# Patient Record
Sex: FEMALE | Race: WHITE
[De-identification: names, ages, dates, MRNs, and addresses within clinical notes are randomized per-mention and may not be internally consistent; named-entity substitution may affect disease eponyms.]

---

## 2019-09-15 ENCOUNTER — HOSPITAL ENCOUNTER (EMERGENCY)
Dept: HOSPITAL 25 - UCEAST | Age: 79
Discharge: HOME | End: 2019-09-15
Payer: MEDICARE

## 2019-09-15 VITALS — DIASTOLIC BLOOD PRESSURE: 52 MMHG | SYSTOLIC BLOOD PRESSURE: 116 MMHG

## 2019-09-15 DIAGNOSIS — Z85.3: ICD-10-CM

## 2019-09-15 DIAGNOSIS — Z79.01: ICD-10-CM

## 2019-09-15 DIAGNOSIS — I48.91: ICD-10-CM

## 2019-09-15 DIAGNOSIS — Z87.891: ICD-10-CM

## 2019-09-15 DIAGNOSIS — L03.115: Primary | ICD-10-CM

## 2019-09-15 PROCEDURE — G0463 HOSPITAL OUTPT CLINIC VISIT: HCPCS

## 2019-09-15 PROCEDURE — 99212 OFFICE O/P EST SF 10 MIN: CPT

## 2019-09-15 NOTE — UC
Skin Complaint HPI





- HPI Summary


HPI Summary: 





started to feel "achey" last pm. did not take temp but felt feverish. patient 

awoke today with red rash R upper arm and across R chest. patient concerned 

about cellulitis since she has bilateral mastectomies and lymphedema R arm. 





- History of Current Complaint


Chief Complaint: UCSkin


Time Seen by Provider: 09/15/19 09:25


Stated Complaint: RASH


Hx Obtained From: Patient


Hx Last Menstrual Period: menopausal


Onset/Duration: Gradual Onset


Current Severity: Mild


Pain Intensity: 2


Location: Other - R upper arm


Aggravating Factor(s): Nothing


Alleviating Factor(s): Nothing


Associated Signs & Symptoms: Negative: Nausea, Shivering, Chills


Similar Episode/Dx as: cellulitis





- Allergy/Home Medications


Allergies/Adverse Reactions: 


 Allergies











Allergy/AdvReac Type Severity Reaction Status Date / Time


 


acetaminophen [From Vicodin] Allergy Intermediate Itching Verified 09/15/19 09:

32


 


Adhesive Tape Allergy Intermediate RED BUMPY Verified 09/15/19 09:32





   ITCHY RASH  


 


gabapentin Allergy Intermediate Itching Verified 09/15/19 09:32


 


hydrocodone AdvReac Severe Itching Verified 09/15/19 09:32


 


oxycodone [From Percocet] AdvReac  Flushing Verified 09/15/19 09:32














PMH/Surg Hx/FS Hx/Imm Hx


Previously Healthy: Yes


Cardiovascular History: Atrial Fibrillation


Cancer History: Breast Cancer


Other History Of: Anticoagulant Therapy - COUMADIN FOR AFIB





- Surgical History


Surgical History: Yes


Surgery Procedure, Year, and Place: RIGHT PATELLA; BILAT MASTECTOMY, BLAS(BONE 

OUTGROWTH-BENIGN) RIGHT JAW, LYMPHNODES REMOVED; BILAT KNEE REPLACEMENTS AT 

[facility]; POWER PORT PLACEMENT AND REMOVED; INGUINAL HERNIA; EAR(CANCER 

REMOVED FROM OUTSIDE)-2014; CARDIAC ABLATION 2011 (FOR AFIB); BILAT CATARACTS-

[facility]; PARTIAL THYROIDECTOMY- REMOVAL OF TUMOR-01/2013; T&A





- Family History


Known Family History: Positive: Cardiac Disease, Hypertension, Diabetes





- Social History


Occupation: Retired


Lives: With Family


Alcohol Use: Occasionally


Alcohol Amount: 1 GLASS OF WINE ON SPECIAL OCCASIONS/HOLIDAYS


Substance Use Type: None


Smoking Status (MU): Former Smoker


Type: Cigarettes


Amount Used/How Often: UP TO 1 1/2 PPD X 8-10 YEARS


Length of Time of Smoking/Using Tobacco: 10 YRS


Have You Smoked in the Last Year: No


When Did the Patient Quit Smoking/Using Tobacco: 1965





- Immunization History


Most Recent Tetanus Shot: 2005





Review of Systems


All Other Systems Reviewed And Are Negative: Yes


Constitutional: Positive: Other - achey


Skin: Positive: Other - redness upper R arm


Respiratory: Positive: Negative


Cardiovascular: Positive: Negative


Neurological: Positive: Negative


Psychological: Positive: Negative





Physical Exam


Triage Information Reviewed: Yes


Appearance: Well-Appearing, No Pain Distress, Well-Nourished


Vital Signs: 


 Initial Vital Signs











Temp  97.5 F   09/15/19 09:26


 


Pulse  60   09/15/19 09:26


 


Resp  18   09/15/19 09:26


 


BP  116/52   09/15/19 09:26


 


Pulse Ox  98   09/15/19 09:26











Vital Signs Reviewed: Yes


Respiratory Exam: Normal


Respiratory: Positive: Lungs clear


Cardiovascular Exam: Other - irregular rate


Musculoskeletal Exam: Normal


Psychological Exam: Normal


Skin: Positive: Other - erythema and warmth upper R arm that extends to R chest 

wall





Course/Dx





- Differential Diagnoses - Skin Complaint


Differential Diagnoses: Cellulitis, Contact Dermatitis, Varicella Zoster





- Diagnoses


Provider Diagnosis: 


 Cellulitis








Discharge ED





- Sign-Out/Discharge


Documenting (check all that apply): Patient Departure


All imaging exams completed and their final reports reviewed: No Studies





- Discharge Plan


Condition: Stable


Disposition: HOME


Prescriptions: 


Cephalexin CAP* [Keflex CAP*] 500 mg PO QID #40 cap


Patient Education Materials:  Cellulitis (ED)


Referrals: 


Kennedy Finn MD [Primary Care Provider] - 2 Days (for recheck)


Additional Instructions: 


apply warm packs to arm


start antibiotic today and take as directed


report to ER if you experience fever or worsening symptoms at anytime





- Billing Disposition and Condition


Condition: STABLE


Disposition: Home

## 2019-09-15 NOTE — XMS REPORT
Continuity of Care Document (CCD)

 Created on:2019



Patient:Yeimy Stoner

Sex:Female

:1940

External Reference #:[id number]





Demographics







 [place] [address]

 

 Home Phone  [phone]

 

 Mobile Phone  [phone]

 

 Email Address  [email or web address]

 

 Preferred Language  en

 

 Marital Status  Not  or 

 

 Sabianist Affiliation  Unknown

 

 Race  White

 

 Ethnic Group  Not  or 









Author







 Name  TRIXIE Humphrey (transmitted by agent of provider Bhavya Murphy)

 

 Address  [address] [place] [id number]









Care Team Providers







 Name  Role  Phone

 

 Isael Rose MD - Family Medicine  Care Team Information   [phone]
[phone]

 

 Kennedy Finn MD - Family Medicine  Care Team Information   [phone]
[phone]









Problems







 Active Problems  Provider  Date

 

 Obstructive sleep apnea of adult  Glendy King DNP, RN,  Onset: 2014



   [facility]  

 

 Atrial fibrillation  Leigh Killian M.D.  Onset: 2014

 

 Obstructive sleep apnea syndrome  Leigh Killian M.D.  Onset: 2014

 

 Dyspnea  Leigh Killian M.D.  Onset: 2014

 

 Essential hypertension  Leigh Killian M.D.  Onset: 2014

 

 Localized, primary osteoarthritis of  Tiki Wei M.D.  Onset: 2016



 the pelvic region and thigh    

 

 Paroxysmal atrial fibrillation  Leigh Killian M.D.  Onset: 2016

 

 Pure hypercholesterolemia  Leigh Killian M.D.  Onset: 2016

 

 Congenital heart disease  Leigh Killian M.D.  Onset: 2016

 

 Gluteal tendinitis  Tiki Wei M.D.  Onset: 2016

 

 Arthralgia of the pelvic region and  Tiki Wei M.D.  Onset: 2016



 thigh    

 

 Congenital abnormality of vein  Leigh Killian M.D.  Onset: 2016

 

 Hypersomnia  Glendy King DNP, RN,  Onset: 2016



   FNP-BC  







Social History







 Type  Date  Description  Comments

 

 Birth Sex    Unknown  

 

 Tobacco Use  Start: Unknown End:  Former Cigarette Smoker  



   Unknown    

 

 Smoking Status  Reviewed: 19  Former Cigarette Smoker  

 

 ETOH Use    Occasionally consumes  wine



     alcohol  

 

 Tobacco Use  Start: Unknown End:  Patient is a former  hx 1-2  per



   Unknown  smoker  day cigarettes,



       quit smoking 1964

 

 Recreational Drug Use    Denies Drug Use  

 

 Exercise Type/Frequency    Exercises sporadically  







Allergies, Adverse Reactions, Alerts







 Active Allergies  Reaction  Severity  Comments  Date

 

 Tape  ITCH, RED BLISTERS    Adhesive Tape  2013

 

 Lisinopril  cough      2013

 

 Neurontin  Contact dermatitis      2013

 

 Percocet  Flushed      12/10/2013

 

 Hydrocodone  itch      12/10/2013







Medications







 Active Medications  SIG  Qnty  Indications  Ordering Provider  Date

 

 Vortex Valved  use as instructed  1units    Linda Mascorro,  2018



 Holding Chamber        MD  



           



 Device          



           

 

 Dofetilide  1 tab by mouth  30caps    Bri Valerio,  2016



           500mcg  twice a day      N.P.  



 Capsules          



           

 

 Ranitidine HCL  1 by mouth twice a      Unknown  2016



               150mg  day        



 Capsules          



           

 

 Potassium Citrate ER  sun,mon,wed,fri      Unknown  



           



 10Meq (1080 mg)          



 Tablets ER          



           

 

 Vitamin D3 Super        Unknown  



 Strength          

 

 Vitamin B-12        Unknown  



     Natural          

 

 Ventolin HFA  1-2 puffs 4 times  18gm    Glendy King,  



   per day as needed      DNP, RN, FNP-BC  



 108(90Base) mcg/Act          



 Aerosol          



           

 

 Cpap        Unknown  



      Device          



           

 

 Vitamin C  1 cap po bid      Unknown  



          500mg          



 Capsules          



           

 

 Tylenol PM  1 tablets at      Unknown  



            Tablets  bedtime        



           

 

 Anastrozole  1 po qd morning      Unknown  



            1mg          



 Tablets          



           

 

 Klor-Con 10  1 po qd evening  30tabs    Unknown  



            10Meq          



 Tablets ER          



           

 

 Calcium + D  1 tab bid      Unknown  



            600mg          



           

 

 Magnesium  1 po qd evening  30tabs    Unknown  



          250mg          



 Tablets          



           

 

 Warfarin Sodium  take as directed  60tabs    Cheo Ramirez,  



                5mg  Lexie Brown tue, MD  



 Tablets  thur , as directed        



           

 

 Pravastatin Sodium  1 tablet by mouth  30tabs    Unknown  



   once daily at        



 20mg Tablets  bedtime        



           

 

 Furosemide  1 by mouth  90tabs    Tenzin Martin,  



           20mg  ,      DO FACC  



 Tablets  ,        



   , Sundays        







Medications Administered in Office







 Medication  SIG  Qnty  Indications  Ordering Provider  Date

 

 Depomedrol 40MG        Jody Campos M.D.  2019



         Injection          



           

 

 Inj, Regadenoson, 0.1 MG        Tenzin Martin, DO [facility]  06/15/2017



                  Injection          



           

 

 Technetium TC 99M        Tenzin Martin, DO [facility]  06/15/2017



 Tetrofosmin, Per Unit Dose          



 Up To 40 Millicuries          



              Injection          



           

 

 Depomedrol 40MG        Tiki Wei M.D.  2016



         Injection          



           







Immunizations







 CPT Code  Status  Date  Vaccine  Lot #

 

 [id number]  Given  2014  Fluzone High Dose  

 

 [id number]  Given  2011  Pneumonia Vaccine  







Vital Signs







 Date  Vital  Result  Comment

 

 2019 11:18am  Height  69 inches  5'9"









 Weight  226.00 lb  

 

 Heart Rate  80 /min  

 

 BP Systolic  130 mmHg  

 

 BP Diastolic  82 mmHg  

 

 Respiratory Rate  18 /min  

 

 BMI (Body Mass Index)  33.4 kg/m2  









 2019  1:04pm  Height  69 inches  5'9"









 Weight  230.00 lb  with shoes

 

 Heart Rate  68 /min  

 

 BP Systolic Sitting  138 mmHg  Lue lg cuff

 

 BP Diastolic Sitting  80 mmHg  Lue lg cuff

 

 Respiratory Rate  16 /min  

 

 BMI (Body Mass Index)  34.0 kg/m2  







Results







 Test  Date  Facility  Test  Result  H/L  Range  Note

 

 Basic Metabolic  2019  [facility]  Sodium  142 mmol/L  Normal
  135-145  



 Panel    [address] [place] [address] [phone]          









 Potassium  4.1 mmol/L  Normal  3.5-5.0  

 

 Chloride  105 mmol/L  Normal  101-111  

 

 Co2 Carbon Dioxide  29 mmol/L  Normal  22-32  

 

 Anion Gap  8 mmol/L  Normal  2-11  

 

 Glucose  125 mg/dL  High    

 

 Blood Urea Nitrogen  27 mg/dL  High  6-24  

 

 Creatinine  0.72 mg/dL  Normal  0.51-0.95  

 

 BUN/Creatinine Ratio  37.5  High  8-20  

 

 Calcium  9.5 mg/dL  Normal  8.6-10.3  

 

 Egfr Non-  78.3    >60  

 

 Egfr   94.8    >60  1









 Basic Metabolic  2019  [facility]  Sodium  142 mmol/L  Normal
  135-145  



 Panel    [address]          



     [place] [address] [phone]          









 Potassium  4.1 mmol/L  Normal  3.5-5.0  

 

 Chloride  105 mmol/L  Normal  101-111  

 

 Co2 Carbon Dioxide  29 mmol/L  Normal  22-32  

 

 Anion Gap  8 mmol/L  Normal  2-11  

 

 Glucose  125 mg/dL  High    

 

 Blood Urea Nitrogen  27 mg/dL  High  6-24  

 

 Creatinine  0.72 mg/dL  Normal  0.51-0.95  

 

 BUN/Creatinine Ratio  37.5  High  8-20  

 

 Calcium  9.5 mg/dL  Normal  8.6-10.3  

 

 Egfr Non-  78.3    >60  

 

 Egfr   94.8    >60  2









 Comp Metabolic  2019  [facility]  Sodium  142 mmol/L  Normal  
135-145  



 Panel    [address]  [place] [address] [phone]          









 Potassium  4.1 mmol/L  Normal  3.5-5.0  

 

 Chloride  105 mmol/L  Normal  101-111  

 

 Co2 Carbon Dioxide  30 mmol/L  Normal  22-32  

 

 Anion Gap  7 mmol/L  Normal  2-11  

 

 Glucose  125 mg/dL  High    

 

 Blood Urea Nitrogen  27 mg/dL  High  6-24  

 

 Creatinine  0.73 mg/dL  Normal  0.51-0.95  

 

 BUN/Creatinine Ratio  37.0  High  8-20  

 

 Calcium  9.6 mg/dL  Normal  8.6-10.3  

 

 Total Protein  6.7 g/dL  Normal  6.4-8.9  

 

 Albumin  4.3 g/dL  Normal  3.2-5.2  

 

 Globulin  2.4 g/dL  Normal  2-4  

 

 Albumin/Globulin Ratio  1.8  Normal  1-3  

 

 Total Bilirubin  0.50 mg/dL  Normal  0.2-1.0  

 

 Alkaline Phosphatase  77 U/L  Normal    

 

 Alt  23 U/L  Normal  7-52  

 

 Ast  18 U/L  Normal  13-39  

 

 Egfr Non-  77.1    >60  

 

 Egfr   93.3    >60  3









 CBC Auto  2019  [facility]  White Blood  6.7 10^3/uL  Normal  
3.5-10.8  



 Diff    [address]  Count        



     [place] [address] [phone]          









 Red Blood Count  4.37 10^6/uL  Normal  3.70-4.87  

 

 Hemoglobin  13.4 g/dL  Normal  12.0-16.0  

 

 Hematocrit  39 %  Normal  35-47  

 

 Mean Corpuscular Volume  90 fL  Normal  80-97  

 

 Mean Corpuscular Hemoglobin  31 pg  Normal  27-31  

 

 Mean Corpuscular HGB Conc  34 g/dL  Normal  31-36  

 

 Red Cell Distribution Width  13 %  Normal  10-15  

 

 Platelet Count  206 10^3/uL  Normal  150-450  

 

 Mean Platelet Volume  8.9 fL  Normal  7.4-10.4  

 

 Abs Neutrophils  4.0 10^3/uL  Normal  1.5-7.7  

 

 Abs Lymphocytes  1.8 10^3/uL  Normal  1.0-4.8  

 

 Abs Monocytes  0.7 10^3/uL  Normal  0-0.8  

 

 Abs Eosinophils  0.1 10^3/uL  Normal  0-0.6  

 

 Abs Basophils  0.0 10^3/uL  Normal  0-0.2  

 

 Abs Nucleated RBC  0.0 10^3/uL      

 

 Granulocyte %  60.8 %      

 

 Lymphocyte %  26.8 %      

 

 Monocyte %  10.0 %      

 

 Eosinophil %  1.8 %      

 

 Basophil %  0.6 %      

 

 Nucleated Red Blood Cells %  0.0      









 Laboratory test  2019  [facility]  Magnesium  2.0 mg/dL  
Normal  1.9-2.7  



 finding    [address]          



     [place] [address] [phone]          

 

 Laboratory test  05/10/2019  [facility]  Vitamin D  38.8 ng/mL  
Normal  20-50  4



 finding    [address]  Total 25(Oh)        



     [place] [address]          



     [phone]          

 

 Rubeola Measles  05/10/2019  [facility]  Rubeola  Positive      5



 Igg AB    [address]  (Measles) IgG        



     [place] [address]  Antibody        



     [phone]          









 Rubeola IgG Antibody Index  >8.0      6









 1  *******Because ethnic data is not always readily available,



   this report includes an eGFR for both -Americans and



   non- Americans.****



   The National Kidney Disease Education Program (NKDEP) does



   not endorse the use of the MDRD equation for patients that



   are not between the ages of 18 and 70, are pregnant, have



   extremes of body size, muscle mass, or nutritional status,



   or are non- or non-.



   According to the National Kidney Foundation, irrespective of



   diagnosis, the stage of the disease is based on the level of



   kidney function:



   Stage Description                      GFR(mL/min/1.73 m(2))



   1     Kidney damage with normal or decreased GFR       90



   2     Kidney damage with mild decrease in GFR          60-89



   3     Moderate decrease in GFR                         30-59



   4     Severe decrease in GFR                           15-29



   5     Kidney failure                       <15 (or dialysis)

 

 2  *******Because ethnic data is not always readily available,



   this report includes an eGFR for both -Americans and



   non- Americans.****



   The National Kidney Disease Education Program (NKDEP) does



   not endorse the use of the MDRD equation for patients that



   are not between the ages of 18 and 70, are pregnant, have



   extremes of body size, muscle mass, or nutritional status,



   or are non- or non-.



   According to the National Kidney Foundation, irrespective of



   diagnosis, the stage of the disease is based on the level of



   kidney function:



   Stage Description                      GFR(mL/min/1.73 m(2))



   1     Kidney damage with normal or decreased GFR       90



   2     Kidney damage with mild decrease in GFR          60-89



   3     Moderate decrease in GFR                         30-59



   4     Severe decrease in GFR                           15-29



   5     Kidney failure                       <15 (or dialysis)

 

 3  *******Because ethnic data is not always readily available,



   this report includes an eGFR for both -Americans and



   non- Americans.****



   The National Kidney Disease Education Program (NKDEP) does



   not endorse the use of the MDRD equation for patients that



   are not between the ages of 18 and 70, are pregnant, have



   extremes of body size, muscle mass, or nutritional status,



   or are non- or non-.



   According to the National Kidney Foundation, irrespective of



   diagnosis, the stage of the disease is based on the level of



   kidney function:



   Stage Description                      GFR(mL/min/1.73 m(2))



   1     Kidney damage with normal or decreased GFR       90



   2     Kidney damage with mild decrease in GFR          60-89



   3     Moderate decrease in GFR                         30-59



   4     Severe decrease in GFR                           15-29



   5     Kidney failure                       <15 (or dialysis)

 

 4  Total 25-Hydroxyvitamin D2 and D3 (25-OH-VitD)



   



   <10 ng/mL (severe deficiency)



   



   10-19 ng/mL (mild to moderate deficiency)



   



   20-50 ng/mL (optimum levels)



   



   51-80 ng/mL (increased risk of hypercalciuria)



   



   >80 ng/mL (toxicity possible)

 

 5  Results suggest response to immunization or



   prior exposure to the virus.



   -------------------REFERENCE VALUE--------------------------



   Vaccinated: Positive (>=1.1 AI)



   Unvaccinated: Negative (<=0.8 AI)

 

 6  Test Performed by:



   [facility]



   [address] [facility], [place] [address]







Procedures







 Date  Code  Description  Status

 

 2019  Inject/Drain Joint/Bursa Small W/O US  Completed

 

 2019  92613  EKG Tracing & Interpretation  Completed

 

 2019  Inject/Drain Joint/Bursa Small W/O US  Completed

 

 2018  [id number]  Colonoscopy  Completed

 

 06/10/2015  [id number]  Bone Mineral Density Test  Completed







[employer]







 Description

 

 No Information Available







Encounters







 Type  Date  Location  Provider  Dx  Diagnosis

 

 Office Visit  2019  Orthopedic  GRETA Hawkins8.Sunday  Unil primary



   11:00a  Services Of  M.D.    osteoarth of first



     C.M.A.      carpometacarp joint,



           r hand







Assessments







 Date  Code  Description  Provider

 

 2019  M18.11  Unilateral primary osteoarthritis of first  STEPHENIE HumphreyC



     carpometacarpal zenon  

 

 2019  I48.0  Paroxysmal atrial fibrillation  Nurse Visit IC

 

 2019  M18.11  Unilateral primary osteoarthritis of first  Jody Campos M.D.



     carpometacarpal j  







Plan of Treatment

Future Appointment(s):2019  4:00 pm - Leigh Killian M.D. at [facility] 
[facility]2019 - Marquita De Jesus RPA-CM18.11 Unilateral primary 
osteoarthritis of first carpometacarpal jFollow up:Follow up:   As needed



Functional Status







 Description

 

 No Information Available







Mental Status







 Description

 

 No Information Available







Referrals







 Description

 

 No Information Available

## 2019-09-15 NOTE — XMS REPORT
Continuity of Care Document (CCD)

 Created on:2019



Patient:Yeimy Stoner

Sex:Female

:1940

External Reference #:[id number]





Demographics







 Address  [address] [place] [address]

 

 Home Phone  [phone]

 

 Mobile Phone  [phone]

 

 Email Address  [email or web address]

 

 Preferred Language  en

 

 Marital Status  Not  or 

 

 Baptism Affiliation  Unknown

 

 Race  White

 

 Ethnic Group  Not  or 









Author







 Name  Leigh Killian M.D. (transmitted by agent of provider Nadine Ruiz)

 

 Address  [address] [place] [address]









Care Team Providers







 Name  Role  Phone

 

 Isael Rose MD - Family Medicine  Care Team Information   [phone]
[phone]

 

 Kennedy Finn MD - Family Medicine  Care Team Information   [phone]
[phone]









Problems







 Active Problems  Provider  Date

 

 Obstructive sleep apnea of adult  Glendy King DNP, RN,  Onset: 2014



   FN-BC  

 

 Atrial fibrillation  Leigh Killian M.D.  Onset: 2014

 

 Obstructive sleep apnea syndrome  Leigh Killian M.D.  Onset: 2014

 

 Dyspnea  Leigh Killian M.D.  Onset: 2014

 

 Essential hypertension  Leigh Killian M.D.  Onset: 2014

 

 Localized, primary osteoarthritis of  Tiki Wei M.D.  Onset: 2016



 the pelvic region and thigh    

 

 Paroxysmal atrial fibrillation  Leigh Killian M.D.  Onset: 2016

 

 Pure hypercholesterolemia  Leigh Killian M.D.  Onset: 2016

 

 Congenital heart disease  Leigh Killian M.D.  Onset: 2016

 

 Gluteal tendinitis  Tiki Wei M.D.  Onset: 2016

 

 Arthralgia of the pelvic region and  Tiki Wei M.D.  Onset: 2016



 thigh    

 

 Congenital abnormality of vein  Leigh Killian M.D.  Onset: 2016

 

 Hypersomnia  Glendy King DNP, RN,  Onset: 2016



   FNP-BC  







Social History







 Type  Date  Description  Comments

 

 Birth Sex    Unknown  

 

 Tobacco Use  Start: Unknown End:  Former Cigarette Smoker  



   Unknown    

 

 Smoking Status  Reviewed: 19  Former Cigarette Smoker  

 

 ETOH Use    Occasionally consumes  wine



     alcohol  

 

 Tobacco Use  Start: Unknown End:  Patient is a former  hx 1-2  per



   Unknown  smoker  day cigarettes,



       quit smoking 1964

 

 Recreational Drug Use    Denies Drug Use  

 

 Exercise Type/Frequency    Exercises sporadically  







Allergies, Adverse Reactions, Alerts







 Active Allergies  Reaction  Severity  Comments  Date

 

 Tape  ITCH, RED BLISTERS    Adhesive Tape  2013

 

 Lisinopril  cough      2013

 

 Neurontin  Contact dermatitis      2013

 

 Percocet  Flushed      12/10/2013

 

 Hydrocodone  itch      12/10/2013







Medications







 Active Medications  SIG  Qnty  Indications  Ordering Provider  Date

 

 Vortex Valved  use as instructed  1units    Linda Mascorro,  2018



 Holding Chamber        MD  



           



 Device          



           

 

 Dofetilide  1 tab by mouth  30caps    Bri Valerio,  2016



           500mcg  twice a day      N.P.  



 Capsules          



           

 

 Ranitidine HCL  1 by mouth twice a      Unknown  2016



               150mg  day        



 Capsules          



           

 

 Furosemide  1 by mouth  90tabs    Tenzin Martin,  



           20mg  ,      DO FACC  



 Tablets  ,        



   , Sundays        

 

 Pravastatin Sodium  1 tablet by mouth  30tabs    Unknown  



   once daily at        



 20mg Tablets  bedtime        



           

 

 Warfarin Sodium  take as directed  60tabs    Cheo Ramirez,  



                5mg  Lexie Brown tue, MD  



 Tablets  thur , as directed        



           

 

 Magnesium  1 po qd evening  30tabs    Unknown  



          250mg          



 Tablets          



           

 

 Calcium + D  1 tab bid      Unknown  



            600mg          



           

 

 Klor-Con 10  as directed  30tabs    Unknown  



            10Meq          



 Tablets ER          



           

 

 Anastrozole  1 po qd morning      Unknown  



            1mg          



 Tablets          



           

 

 Tylenol PM  1 tablets at      Unknown  



            Tablets  bedtime        



           

 

 Vitamin C  1 cap po bid      Unknown  



          500mg          



 Capsules          



           

 

 Cpap        Unknown  



      Device          



           

 

 Ventolin HFA  1-2 puffs 4 times  18gm    Glendy King,  



   per day as needed      DNP, RN, FNP-BC  



 108(90Base) mcg/Act          



 Aerosol          



           

 

 Vitamin B-12        Unknown  



     Natural          







Medications Administered in Office







 Medication  SIG  Qnty  Indications  Ordering Provider  Date

 

 Depomedrol 40MG        TRIXIE Humphrey  2019



         Injection          



           

 

 Depomedrol 40MG        Jody Campos M.D.  2019



         Injection          



           

 

 Inj, Regadenoson, 0.1 MG        Tenzin Martin, DO [facility]  06/15/2017



                  Injection          



           

 

 Technetium TC 99M        Tenzin Martin, DO [facility]  06/15/2017



 Tetrofosmin, Per Unit Dose          



 Up To 40 Millicuries          



              Injection          



           

 

 Depomedrol 40MG        Tiki Wei M.D.  2016



         Injection          



           







Immunizations







 CPT Code  Status  Date  Vaccine  Lot #

 

 [id number]  Given  2014  Fluzone High Dose  

 

 [id number]  Given  2011  Pneumonia Vaccine  







Vital Signs







 Date  Vital  Result  Comment

 

 2019  3:50pm  Height  69 inches  5'9"









 Weight  231.00 lb  with sneakers

 

 Heart Rate  70 /min  

 

 BP Systolic Sitting  118 mmHg  lue reg cuff

 

 BP Diastolic Sitting  80 mmHg  lue reg cuff

 

 BP Systolic Standing  114 mmHg  lue reg cuff

 

 BP Diastolic Standing  78 mmHg  lue reg cuff

 

 Respiratory Rate  14 /min  

 

 BMI (Body Mass Index)  34.1 kg/m2  

 

 Ejection Fraction  62%  echo. 14 11:18am  Height  69 inches  5'9"









 Weight  226.00 lb  

 

 Heart Rate  80 /min  

 

 BP Systolic  130 mmHg  

 

 BP Diastolic  82 mmHg  

 

 Respiratory Rate  18 /min  

 

 BMI (Body Mass Index)  33.4 kg/m2  







Results







 Test  Date  Facility  Test  Result  H/L  Range  Note

 

 Basic Metabolic  2019  [facility]  Sodium  142 mmol/L  Normal
  135-145  



 Panel    [address] [place] [address] [phone]          









 Potassium  4.1 mmol/L  Normal  3.5-5.0  

 

 Chloride  105 mmol/L  Normal  101-111  

 

 Co2 Carbon Dioxide  29 mmol/L  Normal  22-32  

 

 Anion Gap  8 mmol/L  Normal  2-11  

 

 Glucose  125 mg/dL  High    

 

 Blood Urea Nitrogen  27 mg/dL  High  6-24  

 

 Creatinine  0.72 mg/dL  Normal  0.51-0.95  

 

 BUN/Creatinine Ratio  37.5  High  8-20  

 

 Calcium  9.5 mg/dL  Normal  8.6-10.3  

 

 Egfr Non-  78.3    >60  

 

 Egfr   94.8    >60  1









 Basic Metabolic  2019  [facility]  Sodium  142 mmol/L  Normal
  135-145  



 Panel    [address]          



     [place] [address] [phone]          









 Potassium  4.1 mmol/L  Normal  3.5-5.0  

 

 Chloride  105 mmol/L  Normal  101-111  

 

 Co2 Carbon Dioxide  29 mmol/L  Normal  22-32  

 

 Anion Gap  8 mmol/L  Normal  2-11  

 

 Glucose  125 mg/dL  High    

 

 Blood Urea Nitrogen  27 mg/dL  High  6-24  

 

 Creatinine  0.72 mg/dL  Normal  0.51-0.95  

 

 BUN/Creatinine Ratio  37.5  High  8-20  

 

 Calcium  9.5 mg/dL  Normal  8.6-10.3  

 

 Egfr Non-  78.3    >60  

 

 Egfr   94.8    >60  2









 Comp Metabolic  2019  [facility]  Sodium  142 mmol/L  Normal  
135-145  



 Panel    [address]          



     [place] [address] [phone]          









 Potassium  4.1 mmol/L  Normal  3.5-5.0  

 

 Chloride  105 mmol/L  Normal  101-111  

 

 Co2 Carbon Dioxide  30 mmol/L  Normal  22-32  

 

 Anion Gap  7 mmol/L  Normal  2-11  

 

 Glucose  125 mg/dL  High    

 

 Blood Urea Nitrogen  27 mg/dL  High  6-24  

 

 Creatinine  0.73 mg/dL  Normal  0.51-0.95  

 

 BUN/Creatinine Ratio  37.0  High  8-20  

 

 Calcium  9.6 mg/dL  Normal  8.6-10.3  

 

 Total Protein  6.7 g/dL  Normal  6.4-8.9  

 

 Albumin  4.3 g/dL  Normal  3.2-5.2  

 

 Globulin  2.4 g/dL  Normal  2-4  

 

 Albumin/Globulin Ratio  1.8  Normal  1-3  

 

 Total Bilirubin  0.50 mg/dL  Normal  0.2-1.0  

 

 Alkaline Phosphatase  77 U/L  Normal    

 

 Alt  23 U/L  Normal  7-52  

 

 Ast  18 U/L  Normal  13-39  

 

 Egfr Non-  77.1    >60  

 

 Egfr   93.3    >60  3









 CBC Auto  2019  [facility]  White Blood  6.7 10^3/uL  Normal  
3.5-10.8  



 Diff    [address]  Count        



     [place] [address] [phone]          









 Red Blood Count  4.37 10^6/uL  Normal  3.70-4.87  

 

 Hemoglobin  13.4 g/dL  Normal  12.0-16.0  

 

 Hematocrit  39 %  Normal  35-47  

 

 Mean Corpuscular Volume  90 fL  Normal  80-97  

 

 Mean Corpuscular Hemoglobin  31 pg  Normal  27-31  

 

 Mean Corpuscular HGB Conc  34 g/dL  Normal  31-36  

 

 Red Cell Distribution Width  13 %  Normal  10-15  

 

 Platelet Count  206 10^3/uL  Normal  150-450  

 

 Mean Platelet Volume  8.9 fL  Normal  7.4-10.4  

 

 Abs Neutrophils  4.0 10^3/uL  Normal  1.5-7.7  

 

 Abs Lymphocytes  1.8 10^3/uL  Normal  1.0-4.8  

 

 Abs Monocytes  0.7 10^3/uL  Normal  0-0.8  

 

 Abs Eosinophils  0.1 10^3/uL  Normal  0-0.6  

 

 Abs Basophils  0.0 10^3/uL  Normal  0-0.2  

 

 Abs Nucleated RBC  0.0 10^3/uL      

 

 Granulocyte %  60.8 %      

 

 Lymphocyte %  26.8 %      

 

 Monocyte %  10.0 %      

 

 Eosinophil %  1.8 %      

 

 Basophil %  0.6 %      

 

 Nucleated Red Blood Cells %  0.0      









 Laboratory test  2019  [facility]  Magnesium  2.0 mg/dL  
Normal  1.9-2.7  



 finding    [address]          



     [place] [address] [phone]          

 

 Laboratory test  05/10/2019  [facility]  Vitamin D  38.8 ng/mL  
Normal  20-50  4



 finding    [address]  [address] 25([address])        



     [place], [place] [address] [phone]          

 

 Rubeola Measles  05/10/2019  [facility]  Rubeola  Positive      5



 Igg AB    [address]  (Measles) IgG        



     [place] [address]  Antibody        



     [phone]          









 Rubeola IgG Antibody Index  >8.0      6









 1  *******Because ethnic data is not always readily available,



   this report includes an eGFR for both -Americans and



   non- Americans.****



   The National Kidney Disease Education Program (NKDEP) does



   not endorse the use of the MDRD equation for patients that



   are not between the ages of 18 and 70, are pregnant, have



   extremes of body size, muscle mass, or nutritional status,



   or are non- or non-.



   According to the National Kidney Foundation, irrespective of



   diagnosis, the stage of the disease is based on the level of



   kidney function:



   Stage Description                      GFR(mL/min/1.73 m(2))



   1     Kidney damage with normal or decreased GFR       90



   2     Kidney damage with mild decrease in GFR          60-89



   3     Moderate decrease in GFR                         30-59



   4     Severe decrease in GFR                           15-29



   5     Kidney failure                       <15 (or dialysis)

 

 2  *******Because ethnic data is not always readily available,



   this report includes an eGFR for both -Americans and



   non- Americans.****



   The National Kidney Disease Education Program (NKDEP) does



   not endorse the use of the MDRD equation for patients that



   are not between the ages of 18 and 70, are pregnant, have



   extremes of body size, muscle mass, or nutritional status,



   or are non- or non-.



   According to the National Kidney Foundation, irrespective of



   diagnosis, the stage of the disease is based on the level of



   kidney function:



   Stage Description                      GFR(mL/min/1.73 m(2))



   1     Kidney damage with normal or decreased GFR       90



   2     Kidney damage with mild decrease in GFR          60-89



   3     Moderate decrease in GFR                         30-59



   4     Severe decrease in GFR                           15-29



   5     Kidney failure                       <15 (or dialysis)

 

 3  *******Because ethnic data is not always readily available,



   this report includes an eGFR for both -Americans and



   non- Americans.****



   The National Kidney Disease Education Program (NKDEP) does



   not endorse the use of the MDRD equation for patients that



   are not between the ages of 18 and 70, are pregnant, have



   extremes of body size, muscle mass, or nutritional status,



   or are non- or non-.



   According to the National Kidney Foundation, irrespective of



   diagnosis, the stage of the disease is based on the level of



   kidney function:



   Stage Description                      GFR(mL/min/1.73 m(2))



   1     Kidney damage with normal or decreased GFR       90



   2     Kidney damage with mild decrease in GFR          60-89



   3     Moderate decrease in GFR                         30-59



   4     Severe decrease in GFR                           15-29



   5     Kidney failure                       <15 (or dialysis)

 

 4  Total 25-Hydroxyvitamin D2 and D3 (25-OH-VitD)



   



   <10 ng/mL (severe deficiency)



   



   10-19 ng/mL (mild to moderate deficiency)



   



   20-50 ng/mL (optimum levels)



   



   51-80 ng/mL (increased risk of hypercalciuria)



   



   >80 ng/mL (toxicity possible)

 

 5  Results suggest response to immunization or



   prior exposure to the virus.



   -------------------REFERENCE VALUE--------------------------



   Vaccinated: Positive (>=1.1 AI)



   Unvaccinated: Negative (<=0.8 AI)

 

 6  Test Performed by:



   [facility]



   [address] [place] [address]







Procedures







 Date  Code  Description  Status

 

 2019  Inject/Drain Joint/Bursa Small W/O US  Completed

 

 2019  08292  EKG Tracing & Interpretation  Completed

 

 2019  Inject/Drain Joint/Bursa Small W/O US  Completed

 

 2018  76258164  Colonoscopy  Completed

 

 06/10/2015  [id number]  Bone Mineral Density Test  Completed







[employer]







 Description

 

 No Information Available







Encounters







 Type  Date  Location  Provider  Dx  Diagnosis

 

 Office Visit  2019  [facility]  Leigh Killian  I48.0  Paroxysmal 
atrial



   4:00p  Of [facility]  M.D.    fibrillation









 G47.33  Obstructive sleep apnea (adult) (pediatric)

 

 I10  Essential (primary) hypertension









 Office Visit  2019  [facility]  Jody  M18.11  Unil primary



   11:00a  [facility]  LILIA Campos    osteoarth of first



     C.M.A.      carpometacarp



           joint, r hand







Assessments







 Date  Code  Description  Provider

 

 2019  I48.0  Paroxysmal atrial fibrillation  Leigh Killian M.D.

 

 2019  G47.33  Obstructive sleep apnea (adult) (pediatric)  Leigh Killian M.D.

 

 2019  I10  Essential (primary) hypertension  Leigh Killian M.D.

 

 2019  M18.11  Unilateral primary osteoarthritis of first  Marquita De Jesus RPA-C



     carpometacarpal j  

 

 2019  I48.0  Paroxysmal atrial fibrillation  Nurse Visit IC

 

 2019  M18.11  Unilateral primary osteoarthritis of first  Jody Campos M.D.



     carpometacarpal j  







Plan of Treatment

Future Appointment(s):10/07/2019 11:00 am - Glendy King DNP, RN, FNP-BC at 
[facility]2019 - Leigh Killian M.D.I48.0 
Paroxysmal atrial fibrillationFollow up:OK to release any records to the patient
, she is moving in November.  OV: PRNRecommendations:Continue dofetilide and 
coumadin.G47.33 Obstructive sleep apnea (adult) (pediatric)Comments:Using 
CPAPI10 Essential (primary) hypertensionComments:Controlled on lasix, diet, 
exercise.



Functional Status







 Description

 

 No Information Available







Mental Status







 Description

 

 No Information Available







Referrals







 Description

 

 No Information Available